# Patient Record
Sex: FEMALE | Race: WHITE | URBAN - NONMETROPOLITAN AREA
[De-identification: names, ages, dates, MRNs, and addresses within clinical notes are randomized per-mention and may not be internally consistent; named-entity substitution may affect disease eponyms.]

---

## 2022-07-05 ENCOUNTER — WEB ENCOUNTER (OUTPATIENT)
Dept: URBAN - NONMETROPOLITAN AREA CLINIC 2 | Facility: CLINIC | Age: 47
End: 2022-07-05

## 2022-07-07 ENCOUNTER — OFFICE VISIT (OUTPATIENT)
Dept: URBAN - NONMETROPOLITAN AREA CLINIC 2 | Facility: CLINIC | Age: 47
End: 2022-07-07

## 2022-09-16 ENCOUNTER — OFFICE VISIT (OUTPATIENT)
Dept: URBAN - NONMETROPOLITAN AREA CLINIC 2 | Facility: CLINIC | Age: 47
End: 2022-09-16

## 2022-11-10 ENCOUNTER — OFFICE VISIT (OUTPATIENT)
Dept: URBAN - NONMETROPOLITAN AREA CLINIC 2 | Facility: CLINIC | Age: 47
End: 2022-11-10
Payer: COMMERCIAL

## 2022-11-10 ENCOUNTER — WEB ENCOUNTER (OUTPATIENT)
Dept: URBAN - NONMETROPOLITAN AREA CLINIC 2 | Facility: CLINIC | Age: 47
End: 2022-11-10

## 2022-11-10 VITALS
BODY MASS INDEX: 30.02 KG/M2 | HEIGHT: 61 IN | WEIGHT: 159 LBS | DIASTOLIC BLOOD PRESSURE: 82 MMHG | SYSTOLIC BLOOD PRESSURE: 118 MMHG | HEART RATE: 95 BPM | TEMPERATURE: 98 F

## 2022-11-10 DIAGNOSIS — R13.19 ESOPHAGEAL DYSPHAGIA: ICD-10-CM

## 2022-11-10 DIAGNOSIS — Z12.11 COLON CANCER SCREENING: ICD-10-CM

## 2022-11-10 PROCEDURE — 99244 OFF/OP CNSLTJ NEW/EST MOD 40: CPT | Performed by: NURSE PRACTITIONER

## 2022-11-10 RX ORDER — SODIUM, POTASSIUM,MAG SULFATES 17.5-3.13G
177 ML SOLUTION, RECONSTITUTED, ORAL ORAL ONCE
Qty: 177 MILLILITER | Refills: 0 | OUTPATIENT
Start: 2022-11-10 | End: 2022-11-11

## 2022-11-10 RX ORDER — PANTOPRAZOLE SODIUM 40 MG/1
1 TABLET TABLET, DELAYED RELEASE ORAL ONCE A DAY
Qty: 90 TABLET | Refills: 3 | OUTPATIENT
Start: 2022-11-10

## 2022-11-10 RX ORDER — HYDROXYUREA 500 MG/1
CAPSULE ORAL
Qty: 30 APPLICATOR | Refills: 0 | Status: ACTIVE | COMMUNITY

## 2022-11-10 RX ORDER — ALPRAZOLAM 1 MG/1
1 TABLET TABLET ORAL TWICE A DAY
Status: ACTIVE | COMMUNITY
Start: 2022-11-10

## 2022-11-10 NOTE — HPI-TODAY'S VISIT:
Is a 47-year-old female who we have been asked to consult on by Dr. Danny Austin for colonoscopy as well as to discuss ongoing GI complaints. a copy of this note and rec will be sent to referring provider. She is due for her routine colonoscopy.  No lower complaints.  However, for the last several months, she has a feeling of fullness in her chest when she eats.  She typically feels like occasionally solid food will stick in her chest and it is unable to pass even with drinking liquids.  She has had no prior upper endoscopy.  She does occasionally take some Tums for reflux.  No additional complaints.  Does not take any blood thinners.  She does have a past medical history history that is pertinent for polycythemia vera and she is under the care of Dr. Tomas  for this. Sb

## 2022-12-16 ENCOUNTER — OFFICE VISIT (OUTPATIENT)
Dept: URBAN - NONMETROPOLITAN AREA SURGERY CENTER 1 | Facility: SURGERY CENTER | Age: 47
End: 2022-12-16

## 2022-12-16 ENCOUNTER — CLAIMS CREATED FROM THE CLAIM WINDOW (OUTPATIENT)
Dept: URBAN - NONMETROPOLITAN AREA SURGERY CENTER 1 | Facility: SURGERY CENTER | Age: 47
End: 2022-12-16
Payer: COMMERCIAL

## 2022-12-16 DIAGNOSIS — D12.4 ADENOMA OF DESCENDING COLON: ICD-10-CM

## 2022-12-16 DIAGNOSIS — Z12.11 COLON CANCER SCREENING: ICD-10-CM

## 2022-12-16 DIAGNOSIS — K22.2 ACQUIRED ESOPHAGEAL RING: ICD-10-CM

## 2022-12-16 PROCEDURE — 43239 EGD BIOPSY SINGLE/MULTIPLE: CPT | Performed by: INTERNAL MEDICINE

## 2022-12-16 PROCEDURE — G8907 PT DOC NO EVENTS ON DISCHARG: HCPCS | Performed by: INTERNAL MEDICINE

## 2022-12-16 PROCEDURE — 45385 COLONOSCOPY W/LESION REMOVAL: CPT | Performed by: INTERNAL MEDICINE

## 2023-03-15 ENCOUNTER — DASHBOARD ENCOUNTERS (OUTPATIENT)
Age: 48
End: 2023-03-15

## 2023-03-15 ENCOUNTER — OFFICE VISIT (OUTPATIENT)
Dept: URBAN - NONMETROPOLITAN AREA CLINIC 2 | Facility: CLINIC | Age: 48
End: 2023-03-15
Payer: COMMERCIAL

## 2023-03-15 VITALS
WEIGHT: 160 LBS | SYSTOLIC BLOOD PRESSURE: 130 MMHG | HEIGHT: 61 IN | BODY MASS INDEX: 30.21 KG/M2 | TEMPERATURE: 98.1 F | DIASTOLIC BLOOD PRESSURE: 92 MMHG | HEART RATE: 99 BPM

## 2023-03-15 DIAGNOSIS — R49.0 HOARSE VOICE QUALITY: ICD-10-CM

## 2023-03-15 DIAGNOSIS — R13.19 ESOPHAGEAL DYSPHAGIA: ICD-10-CM

## 2023-03-15 DIAGNOSIS — K44.9 HIATAL HERNIA: ICD-10-CM

## 2023-03-15 DIAGNOSIS — Z86.010 PERSONAL HISTORY OF COLONIC POLYPS: ICD-10-CM

## 2023-03-15 PROBLEM — 428283002: Status: ACTIVE | Noted: 2023-03-15

## 2023-03-15 PROCEDURE — 99213 OFFICE O/P EST LOW 20 MIN: CPT | Performed by: NURSE PRACTITIONER

## 2023-03-15 RX ORDER — ALPRAZOLAM 1 MG/1
1/2 TABLET TABLET ORAL TWICE A DAY
Status: ACTIVE | COMMUNITY
Start: 2022-11-10

## 2023-03-15 RX ORDER — PANTOPRAZOLE SODIUM 40 MG/1
1 TABLET TABLET, DELAYED RELEASE ORAL ONCE A DAY
Qty: 90 TABLET | Refills: 3 | OUTPATIENT

## 2023-03-15 RX ORDER — HYDROXYUREA 500 MG/1
CAPSULE ORAL
Qty: 30 APPLICATOR | Refills: 0 | Status: ACTIVE | COMMUNITY

## 2023-03-15 RX ORDER — FAMOTIDINE 40 MG/1
1 TABLET TABLET, FILM COATED ORAL TWICE A DAY
Qty: 180 TABLET | Refills: 3 | OUTPATIENT
Start: 2023-03-15

## 2023-03-15 RX ORDER — PANTOPRAZOLE SODIUM 40 MG/1
1 TABLET TABLET, DELAYED RELEASE ORAL ONCE A DAY
Qty: 90 TABLET | Refills: 3 | Status: ACTIVE | COMMUNITY
Start: 2022-11-10

## 2023-03-15 NOTE — HPI-TODAY'S VISIT:
Nevaeh is a 48-year-old female who returns for follow-up of her screening colonoscopy and dysphagia as well as to discuss new complaint of hoarseness.  All of her upper GI complaints she initially presented with such as reflux and dysphagia have resolved with pantoprazole daily.  She does report that she is chronically hoarse and she wonders if this may be reflux related as well.  She does not seen an ENT as of yet. 12/2020 colonoscopy with 1 TA-repeat in 5 years 12/22 EGD with 4 cm hiatal hernia and nonobstructing Schatzki ring as well as esophagitis.  Pathology was negative. Sb

## 2023-03-15 NOTE — PHYSICAL EXAM CHEST:
normal respiratory pattern
Sandra Arce  [id number]  [address] [place] [address]  Phone: [phone]  Fax: [phone]  Follow Up Time:

## 2023-09-15 ENCOUNTER — OFFICE VISIT (OUTPATIENT)
Dept: URBAN - NONMETROPOLITAN AREA CLINIC 2 | Facility: CLINIC | Age: 48
End: 2023-09-15